# Patient Record
Sex: FEMALE | URBAN - NONMETROPOLITAN AREA
[De-identification: names, ages, dates, MRNs, and addresses within clinical notes are randomized per-mention and may not be internally consistent; named-entity substitution may affect disease eponyms.]

---

## 2022-02-01 ENCOUNTER — APPOINTMENT (RX ONLY)
Dept: URBAN - NONMETROPOLITAN AREA CLINIC 16 | Facility: CLINIC | Age: 57
Setting detail: DERMATOLOGY
End: 2022-02-01

## 2022-02-01 VITALS — WEIGHT: 293 LBS | HEIGHT: 63 IN

## 2022-02-01 DIAGNOSIS — L65.0 TELOGEN EFFLUVIUM: ICD-10-CM | Status: IMPROVED

## 2022-02-01 PROCEDURE — 99203 OFFICE O/P NEW LOW 30 MIN: CPT

## 2022-02-01 PROCEDURE — ? FULL BODY SKIN EXAM - DECLINED

## 2022-02-01 PROCEDURE — ? COUNSELING

## 2022-02-01 PROCEDURE — ? TREATMENT REGIMEN

## 2022-02-01 ASSESSMENT — LOCATION DETAILED DESCRIPTION DERM: LOCATION DETAILED: LEFT SUPERIOR PARIETAL SCALP

## 2022-02-01 ASSESSMENT — LOCATION ZONE DERM: LOCATION ZONE: SCALP

## 2022-02-01 ASSESSMENT — LOCATION SIMPLE DESCRIPTION DERM: LOCATION SIMPLE: SCALP

## 2022-02-01 NOTE — PROCEDURE: TREATMENT REGIMEN
Detail Level: Zone
Plan: Pt states around September/October she was going through a court issue with her foster children. \\n\\nShe states has recently seen improvement the last 3 weeks.\\n\\nHad recent blood work, pt states results were normal in July 2021.\\n\\nPt would like to watch hair loss at this time, if no improvement by month 6 [facility] recommends returning for a scalp biopsy.